# Patient Record
Sex: MALE | Race: WHITE | NOT HISPANIC OR LATINO | Employment: OTHER | URBAN - METROPOLITAN AREA
[De-identification: names, ages, dates, MRNs, and addresses within clinical notes are randomized per-mention and may not be internally consistent; named-entity substitution may affect disease eponyms.]

---

## 2023-04-19 ENCOUNTER — OFFICE VISIT (OUTPATIENT)
Dept: PULMONOLOGY | Facility: CLINIC | Age: 68
End: 2023-04-19
Payer: MEDICARE

## 2023-04-19 VITALS
HEART RATE: 65 BPM | BODY MASS INDEX: 22.38 KG/M2 | DIASTOLIC BLOOD PRESSURE: 74 MMHG | WEIGHT: 156.31 LBS | HEIGHT: 70 IN | SYSTOLIC BLOOD PRESSURE: 112 MMHG | OXYGEN SATURATION: 92 % | RESPIRATION RATE: 18 BRPM

## 2023-04-19 DIAGNOSIS — J96.11 CHRONIC RESPIRATORY FAILURE WITH HYPOXIA: Chronic | ICD-10-CM

## 2023-04-19 DIAGNOSIS — J43.2 CENTRILOBULAR EMPHYSEMA: Primary | ICD-10-CM

## 2023-04-19 DIAGNOSIS — Z87.891 HISTORY OF CIGARETTE SMOKING: Chronic | ICD-10-CM

## 2023-04-19 PROCEDURE — 1159F PR MEDICATION LIST DOCUMENTED IN MEDICAL RECORD: ICD-10-PCS | Mod: CPTII,S$GLB,, | Performed by: INTERNAL MEDICINE

## 2023-04-19 PROCEDURE — 3074F PR MOST RECENT SYSTOLIC BLOOD PRESSURE < 130 MM HG: ICD-10-PCS | Mod: CPTII,S$GLB,, | Performed by: INTERNAL MEDICINE

## 2023-04-19 PROCEDURE — 99999 PR PBB SHADOW E&M-NEW PATIENT-LVL IV: ICD-10-PCS | Mod: PBBFAC,,, | Performed by: INTERNAL MEDICINE

## 2023-04-19 PROCEDURE — 1101F PR PT FALLS ASSESS DOC 0-1 FALLS W/OUT INJ PAST YR: ICD-10-PCS | Mod: CPTII,S$GLB,, | Performed by: INTERNAL MEDICINE

## 2023-04-19 PROCEDURE — 99214 OFFICE O/P EST MOD 30 MIN: CPT | Mod: S$GLB,,, | Performed by: INTERNAL MEDICINE

## 2023-04-19 PROCEDURE — 3078F PR MOST RECENT DIASTOLIC BLOOD PRESSURE < 80 MM HG: ICD-10-PCS | Mod: CPTII,S$GLB,, | Performed by: INTERNAL MEDICINE

## 2023-04-19 PROCEDURE — 3008F BODY MASS INDEX DOCD: CPT | Mod: CPTII,S$GLB,, | Performed by: INTERNAL MEDICINE

## 2023-04-19 PROCEDURE — 3078F DIAST BP <80 MM HG: CPT | Mod: CPTII,S$GLB,, | Performed by: INTERNAL MEDICINE

## 2023-04-19 PROCEDURE — 3288F PR FALLS RISK ASSESSMENT DOCUMENTED: ICD-10-PCS | Mod: CPTII,S$GLB,, | Performed by: INTERNAL MEDICINE

## 2023-04-19 PROCEDURE — 99214 PR OFFICE/OUTPT VISIT, EST, LEVL IV, 30-39 MIN: ICD-10-PCS | Mod: S$GLB,,, | Performed by: INTERNAL MEDICINE

## 2023-04-19 PROCEDURE — 3074F SYST BP LT 130 MM HG: CPT | Mod: CPTII,S$GLB,, | Performed by: INTERNAL MEDICINE

## 2023-04-19 PROCEDURE — 3288F FALL RISK ASSESSMENT DOCD: CPT | Mod: CPTII,S$GLB,, | Performed by: INTERNAL MEDICINE

## 2023-04-19 PROCEDURE — 1126F AMNT PAIN NOTED NONE PRSNT: CPT | Mod: CPTII,S$GLB,, | Performed by: INTERNAL MEDICINE

## 2023-04-19 PROCEDURE — 99999 PR PBB SHADOW E&M-NEW PATIENT-LVL IV: CPT | Mod: PBBFAC,,, | Performed by: INTERNAL MEDICINE

## 2023-04-19 PROCEDURE — 1126F PR PAIN SEVERITY QUANTIFIED, NO PAIN PRESENT: ICD-10-PCS | Mod: CPTII,S$GLB,, | Performed by: INTERNAL MEDICINE

## 2023-04-19 PROCEDURE — 3008F PR BODY MASS INDEX (BMI) DOCUMENTED: ICD-10-PCS | Mod: CPTII,S$GLB,, | Performed by: INTERNAL MEDICINE

## 2023-04-19 PROCEDURE — 1159F MED LIST DOCD IN RCRD: CPT | Mod: CPTII,S$GLB,, | Performed by: INTERNAL MEDICINE

## 2023-04-19 PROCEDURE — 1101F PT FALLS ASSESS-DOCD LE1/YR: CPT | Mod: CPTII,S$GLB,, | Performed by: INTERNAL MEDICINE

## 2023-04-19 PROCEDURE — 1160F RVW MEDS BY RX/DR IN RCRD: CPT | Mod: CPTII,S$GLB,, | Performed by: INTERNAL MEDICINE

## 2023-04-19 PROCEDURE — 1160F PR REVIEW ALL MEDS BY PRESCRIBER/CLIN PHARMACIST DOCUMENTED: ICD-10-PCS | Mod: CPTII,S$GLB,, | Performed by: INTERNAL MEDICINE

## 2023-04-19 RX ORDER — ACETAMINOPHEN 500 MG
1 TABLET ORAL DAILY
COMMUNITY

## 2023-04-19 RX ORDER — ASPIRIN 81 MG/1
81 TABLET ORAL DAILY
COMMUNITY

## 2023-04-19 RX ORDER — TRAZODONE HYDROCHLORIDE 150 MG/1
TABLET ORAL
COMMUNITY
Start: 2023-04-15

## 2023-04-19 RX ORDER — SIMVASTATIN 40 MG/1
1 TABLET, FILM COATED ORAL DAILY
COMMUNITY
Start: 2023-03-30 | End: 2024-01-25 | Stop reason: SDUPTHER

## 2023-04-19 RX ORDER — FLUTICASONE FUROATE, UMECLIDINIUM BROMIDE AND VILANTEROL TRIFENATATE 200; 62.5; 25 UG/1; UG/1; UG/1
POWDER RESPIRATORY (INHALATION)
COMMUNITY
Start: 2022-08-29 | End: 2023-05-16

## 2023-04-19 NOTE — PROGRESS NOTES
Subjective:      Patient ID: Guanako Santiago is a 67 y.o. male.    Chief Complaint: F/U Emphysema    HPI    66 yo male with a history of severe emphysema and hx of lung CA. S/P lobectomy and chemoradiotherapy. BEHZAD thus far after 7 years. Also followed by Dr. Serna.     Had EB valve placement in [facility] on 12/13/21. Thus far has had a modest but favorable response  - CT showed less than complete atelectasis of the left lower lobe in March 2022  - Here to re-establish care at new [facility] location  - Stable dyspne aon exertion  - Desaturated previously on walk test at [facility], was not inclined to start oxygen at that time  - Thinks he probably needs it  - Otherwise no new complaints    Review of Systems as per HPI otherwise negative    Objective:     Physical Exam   Constitutional: He is oriented to person, place, and time. He appears well-developed. No distress.   Cardiovascular: Normal rate and regular rhythm.   Pulmonary/Chest: Hyperinflation and effort normal. He has decreased breath sounds. He has no wheezes.   Abdominal: Soft.   Musculoskeletal:      Cervical back: Neck supple.   Neurological: He is alert and oriented to person, place, and time.   Nursing note and vitals reviewed.       Assessment:     1. Centrilobular emphysema         Orders Placed This Encounter   Procedures    CT Chest Without Contrast     Standing Status:   Future     Standing Expiration Date:   4/19/2024     Order Specific Question:   May the Radiologist modify the order per protocol to meet the clinical needs of the patient?     Answer:   Yes    Ambulatory referral/consult to Transplant, Lung     Standing Status:   Future     Standing Expiration Date:   5/19/2024     Referral Priority:   Routine     Referral Type:   Transplants     Number of Visits Requested:   1    Stress test, pulmonary     Standing Status:   Future     Standing Expiration Date:   4/19/2024     Order Specific Question:   Reason for study     Answer:   Oxygen prescription      Order Specific Question:   Release to patient     Answer:   Immediate    Complete PFT w/ bronchodilator     Standing Status:   Future     Standing Expiration Date:   4/19/2024     Order Specific Question:   Release to patient     Answer:   Immediate         Plan:     We will get updated PFTs and 6 minute walk.  Likely will need oxygen with exertion and sleep and we can order once his need as established by the walk test.  We will also get PFTs done at that time.  He is due for yearly CT and we will order this for the next week or 2 as well.  Currently on Trelegy and as needed albuterol.  Encouraged him to continue exercising as tolerated.  He did inquire about the possibility of lung transplant and I placed a referral for evaluation for transplant.  He should be far enough out from his lung cancer that this should not be an issue.  I will notify him of test results as they return in place appropriate orders such as portable oxygen based on results.  Otherwise I will see him back in 3 months, sooner if needed

## 2023-04-20 ENCOUNTER — TELEPHONE (OUTPATIENT)
Dept: PULMONOLOGY | Facility: CLINIC | Age: 68
End: 2023-04-20
Payer: MEDICARE

## 2023-04-20 NOTE — TELEPHONE ENCOUNTER
----- Message from Sofia Alvarado sent at 4/20/2023  1:14 PM CDT -----  [employer] prior auth department is requesting a call back concerning a prior auth for the pt. Call back number is [phone]

## 2023-05-03 ENCOUNTER — CLINICAL SUPPORT (OUTPATIENT)
Dept: PULMONOLOGY | Facility: CLINIC | Age: 68
End: 2023-05-03
Payer: MEDICARE

## 2023-05-03 ENCOUNTER — HOSPITAL ENCOUNTER (OUTPATIENT)
Dept: RADIOLOGY | Facility: HOSPITAL | Age: 68
Discharge: HOME | End: 2023-05-03
Attending: INTERNAL MEDICINE
Payer: MEDICARE

## 2023-05-03 VITALS — BODY MASS INDEX: 22 KG/M2 | WEIGHT: 153.69 LBS | HEIGHT: 70 IN

## 2023-05-03 DIAGNOSIS — J96.11 CHRONIC RESPIRATORY FAILURE WITH HYPOXIA: Primary | ICD-10-CM

## 2023-05-03 DIAGNOSIS — J43.2 CENTRILOBULAR EMPHYSEMA: ICD-10-CM

## 2023-05-03 PROCEDURE — 71250 CT CHEST WITHOUT CONTRAST: ICD-10-PCS | Mod: 26,,, | Performed by: RADIOLOGY

## 2023-05-03 PROCEDURE — 99999 PR PBB SHADOW E&M-EST. PATIENT-LVL II: ICD-10-PCS | Mod: PBBFAC,,,

## 2023-05-03 PROCEDURE — 94729 PR C02/MEMBANE DIFFUSE CAPACITY: ICD-10-PCS | Mod: S$GLB,,, | Performed by: INTERNAL MEDICINE

## 2023-05-03 PROCEDURE — 94726 PULM FUNCT TST PLETHYSMOGRAP: ICD-10-PCS | Mod: S$GLB,,, | Performed by: INTERNAL MEDICINE

## 2023-05-03 PROCEDURE — 99999 PR PBB SHADOW E&M-EST. PATIENT-LVL II: CPT | Mod: PBBFAC,,,

## 2023-05-03 PROCEDURE — 94726 PLETHYSMOGRAPHY LUNG VOLUMES: CPT | Mod: S$GLB,,, | Performed by: INTERNAL MEDICINE

## 2023-05-03 PROCEDURE — 71250 CT THORAX DX C-: CPT | Mod: TC

## 2023-05-03 PROCEDURE — 94618 PULMONARY STRESS TESTING: CPT | Mod: S$GLB,,, | Performed by: INTERNAL MEDICINE

## 2023-05-03 PROCEDURE — 94060 PR EVAL OF BRONCHOSPASM: ICD-10-PCS | Mod: S$GLB,,, | Performed by: INTERNAL MEDICINE

## 2023-05-03 PROCEDURE — 94729 DIFFUSING CAPACITY: CPT | Mod: S$GLB,,, | Performed by: INTERNAL MEDICINE

## 2023-05-03 PROCEDURE — 71250 CT THORAX DX C-: CPT | Mod: 26,,, | Performed by: RADIOLOGY

## 2023-05-03 PROCEDURE — 94060 EVALUATION OF WHEEZING: CPT | Mod: S$GLB,,, | Performed by: INTERNAL MEDICINE

## 2023-05-03 PROCEDURE — 94618 PULMONARY STRESS TESTING: ICD-10-PCS | Mod: S$GLB,,, | Performed by: INTERNAL MEDICINE

## 2023-05-03 NOTE — PROGRESS NOTES
Six Minute Walk Test completed. Dr. Sánchez and E. Lejeune, NP made aware of pt qualifying for O2 on exertion.

## 2023-05-03 NOTE — PROCEDURES
"The [place]  Six Minute Walk   SUMMARY   Ordering Provider: Arturo Sánchez MD   Interpreting Provider: Arturo Sánchez MD  Performing nurse/tech/RT: VT, RT  Diagnosis:  (Centrilobular emphysema)  Height: 5' 10" (177.8 cm)  Weight: 69.7 kg (153 lb 10.6 oz)  BMI (Calculated): 22   Patient Race:    Phase Oxygen Assessment Supplemental O2 Heart   Rate Blood Pressure Deidre Dyspnea Scale Rating   Resting 92 % Room Air 59 bpm 135/73 0   Exercise        Minute        1 88 % Room Air 70 bpm     2 94 % 2 L/M 68 bpm     3 94 % 2 L/M 72 bpm     4 91 % 2 L/M 72 bpm     5 90 % 2 L/M 69 bpm     6  90 % 2 L/M 72 bpm 189/88 2   Recovery        Minute        1 96 % 2 L/M 66 bpm     2 97 % 2 L/M 63 bpm     3 97 % 2 L/M 59 bpm     4 98 % 2 L/M 59 bpm 153/75 0     Six Minute Walk Summary  6MWT Status: completed without stopping  Patient Reported: Other (Comment), Dyspnea (back pain)     Interpretation:  Did the patient stop or pause?: No         Total Time Walked (Calculated): 360 seconds  Final Partial Lap Distance (feet): 50 feet  Total Distance Meters (Calculated): 441.96 meters  Predicted Distance Meters (Calculated): 587.97 meters  Percentage of Predicted (Calculated): 75.17  Peak VO2 (Calculated): 17.24  Mets: 4.93  Has The Patient Had a Previous Six Minute Walk Test?: No       Previous 6MWT Results  Has The Patient Had a Previous Six Minute Walk Test?: No      "

## 2023-05-09 LAB
BRPFT: ABNORMAL
DLCO ADJ PRE: 14.05 ML/(MIN*MMHG) (ref 21.31–35.16)
DLCO SINGLE BREATH LLN: 21.31
DLCO SINGLE BREATH PRE REF: 49.8 %
DLCO SINGLE BREATH REF: 28.24
DLCOC SBVA LLN: 2.79
DLCOC SBVA PRE REF: 101.1 %
DLCOC SBVA REF: 3.95
DLCOC SINGLE BREATH LLN: 21.31
DLCOC SINGLE BREATH PRE REF: 49.8 %
DLCOC SINGLE BREATH REF: 28.24
DLCOVA LLN: 2.79
DLCOVA PRE REF: 101.1 %
DLCOVA PRE: 4 ML/(MIN*MMHG*L) (ref 2.79–5.12)
DLCOVA REF: 3.95
DLVAADJ PRE: 4 ML/(MIN*MMHG*L) (ref 2.79–5.12)
ERV LLN: -16448.87
ERV PRE REF: 85.1 %
ERV REF: 1.13
FEF 25 75 CHG: -3.5 %
FEF 25 75 LLN: 1.22
FEF 25 75 POST REF: 9.2 %
FEF 25 75 PRE REF: 9.6 %
FEF 25 75 REF: 2.66
FET100 CHG: 12.4 %
FEV1 CHG: 3.6 %
FEV1 FVC CHG: -8.6 %
FEV1 FVC LLN: 64
FEV1 FVC POST REF: 40.2 %
FEV1 FVC PRE REF: 43.9 %
FEV1 FVC REF: 77
FEV1 LLN: 2.49
FEV1 POST REF: 24 %
FEV1 PRE REF: 23.2 %
FEV1 REF: 3.38
FRCPLETH LLN: 2.67
FRCPLETH PREREF: 71.2 %
FRCPLETH REF: 3.66
FVC CHG: 13.3 %
FVC LLN: 3.32
FVC POST REF: 59.6 %
FVC PRE REF: 52.6 %
FVC REF: 4.42
IVC PRE: 2.48 L (ref 3.32–5.53)
IVC SINGLE BREATH LLN: 3.32
IVC SINGLE BREATH PRE REF: 56 %
IVC SINGLE BREATH REF: 4.42
PEF CHG: 3.3 %
PEF LLN: 6.52
PEF POST REF: 34.8 %
PEF PRE REF: 33.7 %
PEF REF: 8.84
POST FEF 25 75: 0.25 L/S (ref 1.22–4.1)
POST FET 100: 14.26 SEC
POST FEV1 FVC: 30.75 % (ref 63.78–89.38)
POST FEV1: 0.81 L (ref 2.49–4.27)
POST FVC: 2.64 L (ref 3.32–5.53)
POST PEF: 3.08 L/S (ref 6.52–11.16)
PRE DLCO: 14.05 ML/(MIN*MMHG) (ref 21.31–35.16)
PRE ERV: 0.96 L (ref -16448.87–16451.13)
PRE FEF 25 75: 0.26 L/S (ref 1.22–4.1)
PRE FET 100: 12.69 SEC
PRE FEV1 FVC: 33.65 % (ref 63.78–89.38)
PRE FEV1: 0.78 L (ref 2.49–4.27)
PRE FRC PL: 2.61 L
PRE FVC: 2.33 L (ref 3.32–5.53)
PRE PEF: 2.98 L/S (ref 6.52–11.16)
PRE RV: 1.63 L (ref 1.86–3.21)
PRE TLC: 3.98 L (ref 5.99–8.29)
RAW LLN: 3.06
RAW PRE REF: 946.1 %
RAW PRE: 28.94 CMH2O*S/L (ref 3.06–3.06)
RAW REF: 3.06
RV LLN: 1.86
RV PRE REF: 64.3 %
RV REF: 2.53
RVTLC LLN: 30
RVTLC PRE REF: 104.1 %
RVTLC PRE: 40.91 % (ref 30.33–48.29)
RVTLC REF: 39
TLC LLN: 5.99
TLC PRE REF: 55.7 %
TLC REF: 7.14
VA PRE: 3.52 L (ref 6.99–6.99)
VA SINGLE BREATH LLN: 6.99
VA SINGLE BREATH PRE REF: 50.4 %
VA SINGLE BREATH REF: 6.99
VC LLN: 3.32
VC PRE REF: 53.1 %
VC PRE: 2.35 L (ref 3.32–5.53)
VC REF: 4.42
VTGRAWPRE: 2.76 L

## 2023-05-16 RX ORDER — FLUTICASONE FUROATE, UMECLIDINIUM BROMIDE AND VILANTEROL TRIFENATATE 200; 62.5; 25 UG/1; UG/1; UG/1
1 POWDER RESPIRATORY (INHALATION) DAILY
Qty: 60 EACH | Refills: 6 | Status: SHIPPED | OUTPATIENT
Start: 2023-05-16 | End: 2023-10-09 | Stop reason: SDUPTHER

## 2023-07-24 ENCOUNTER — OFFICE VISIT (OUTPATIENT)
Dept: PULMONOLOGY | Facility: CLINIC | Age: 68
End: 2023-07-24
Payer: MEDICARE

## 2023-07-24 VITALS
OXYGEN SATURATION: 94 % | SYSTOLIC BLOOD PRESSURE: 130 MMHG | WEIGHT: 160.94 LBS | BODY MASS INDEX: 23.04 KG/M2 | RESPIRATION RATE: 20 BRPM | DIASTOLIC BLOOD PRESSURE: 74 MMHG | HEART RATE: 58 BPM | HEIGHT: 70 IN

## 2023-07-24 DIAGNOSIS — Z85.118 HISTORY OF LUNG CANCER: Chronic | ICD-10-CM

## 2023-07-24 DIAGNOSIS — J43.2 CENTRILOBULAR EMPHYSEMA: Primary | ICD-10-CM

## 2023-07-24 PROBLEM — J96.11 CHRONIC RESPIRATORY FAILURE WITH HYPOXIA: Chronic | Status: RESOLVED | Noted: 2023-04-19 | Resolved: 2023-07-24

## 2023-07-24 PROCEDURE — 99999 PR PBB SHADOW E&M-EST. PATIENT-LVL IV: ICD-10-PCS | Mod: PBBFAC,,, | Performed by: INTERNAL MEDICINE

## 2023-07-24 PROCEDURE — 1101F PR PT FALLS ASSESS DOC 0-1 FALLS W/OUT INJ PAST YR: ICD-10-PCS | Mod: CPTII,S$GLB,, | Performed by: INTERNAL MEDICINE

## 2023-07-24 PROCEDURE — 3078F PR MOST RECENT DIASTOLIC BLOOD PRESSURE < 80 MM HG: ICD-10-PCS | Mod: CPTII,S$GLB,, | Performed by: INTERNAL MEDICINE

## 2023-07-24 PROCEDURE — 3078F DIAST BP <80 MM HG: CPT | Mod: CPTII,S$GLB,, | Performed by: INTERNAL MEDICINE

## 2023-07-24 PROCEDURE — 1160F RVW MEDS BY RX/DR IN RCRD: CPT | Mod: CPTII,S$GLB,, | Performed by: INTERNAL MEDICINE

## 2023-07-24 PROCEDURE — 3075F PR MOST RECENT SYSTOLIC BLOOD PRESS GE 130-139MM HG: ICD-10-PCS | Mod: CPTII,S$GLB,, | Performed by: INTERNAL MEDICINE

## 2023-07-24 PROCEDURE — 99999 PR PBB SHADOW E&M-EST. PATIENT-LVL IV: CPT | Mod: PBBFAC,,, | Performed by: INTERNAL MEDICINE

## 2023-07-24 PROCEDURE — 99213 PR OFFICE/OUTPT VISIT, EST, LEVL III, 20-29 MIN: ICD-10-PCS | Mod: S$GLB,,, | Performed by: INTERNAL MEDICINE

## 2023-07-24 PROCEDURE — 3288F PR FALLS RISK ASSESSMENT DOCUMENTED: ICD-10-PCS | Mod: CPTII,S$GLB,, | Performed by: INTERNAL MEDICINE

## 2023-07-24 PROCEDURE — 3008F BODY MASS INDEX DOCD: CPT | Mod: CPTII,S$GLB,, | Performed by: INTERNAL MEDICINE

## 2023-07-24 PROCEDURE — 1159F MED LIST DOCD IN RCRD: CPT | Mod: CPTII,S$GLB,, | Performed by: INTERNAL MEDICINE

## 2023-07-24 PROCEDURE — 3075F SYST BP GE 130 - 139MM HG: CPT | Mod: CPTII,S$GLB,, | Performed by: INTERNAL MEDICINE

## 2023-07-24 PROCEDURE — 3008F PR BODY MASS INDEX (BMI) DOCUMENTED: ICD-10-PCS | Mod: CPTII,S$GLB,, | Performed by: INTERNAL MEDICINE

## 2023-07-24 PROCEDURE — 1159F PR MEDICATION LIST DOCUMENTED IN MEDICAL RECORD: ICD-10-PCS | Mod: CPTII,S$GLB,, | Performed by: INTERNAL MEDICINE

## 2023-07-24 PROCEDURE — 1126F AMNT PAIN NOTED NONE PRSNT: CPT | Mod: CPTII,S$GLB,, | Performed by: INTERNAL MEDICINE

## 2023-07-24 PROCEDURE — 3288F FALL RISK ASSESSMENT DOCD: CPT | Mod: CPTII,S$GLB,, | Performed by: INTERNAL MEDICINE

## 2023-07-24 PROCEDURE — 1160F PR REVIEW ALL MEDS BY PRESCRIBER/CLIN PHARMACIST DOCUMENTED: ICD-10-PCS | Mod: CPTII,S$GLB,, | Performed by: INTERNAL MEDICINE

## 2023-07-24 PROCEDURE — 99213 OFFICE O/P EST LOW 20 MIN: CPT | Mod: S$GLB,,, | Performed by: INTERNAL MEDICINE

## 2023-07-24 PROCEDURE — 1126F PR PAIN SEVERITY QUANTIFIED, NO PAIN PRESENT: ICD-10-PCS | Mod: CPTII,S$GLB,, | Performed by: INTERNAL MEDICINE

## 2023-07-24 PROCEDURE — 1101F PT FALLS ASSESS-DOCD LE1/YR: CPT | Mod: CPTII,S$GLB,, | Performed by: INTERNAL MEDICINE

## 2023-07-24 NOTE — PROGRESS NOTES
Subjective:      Patient ID: Guanako Santiago is a 68 y.o. male.    Chief Complaint: COPD    COPD      67 yo male with a history of severe emphysema and hx of lung CA. S/P lobectomy and chemoradiotherapy. BEHZAD thus far after 7 years. Also followed by Dr. Serna.     Had EB valve placement in [facility] on 12/13/21. Thus far has had a modest but favorable response  - CT showed less than complete atelectasis of the left lower lobe in March 2022  - Here to re-establish care at new [facility] location  - Stable dyspne aon exertion  - Desaturated previously on walk test at [facility], was not inclined to start oxygen at that time  - Thinks he probably needs it  - Otherwise no new complaints    July 2023  - overall stable  - had PFTs and 6 minute walk as well as a CT of the chest in May  - desaturation with walk test and was prescribed portable oxygen  - currently has large portable oxygen concentrator that is a poor behind but would rather have small portable oxygen concentrator for increased mobility  - states that he intermittently has hemoptysis since valve placement this has been ongoing and fairly stable vital appear for few days of being gone for a few weeks  - CT showed some scattered lung nodules which are stable but no new or detrimental findings    Review of Systems as per HPI otherwise negative    Objective:     Physical Exam   Constitutional: He is oriented to person, place, and time. He appears well-developed. No distress.   HENT:   Head: Normocephalic.   Cardiovascular: Normal rate and regular rhythm.   Pulmonary/Chest: Hyperinflation and effort normal. He has decreased breath sounds.   Abdominal: Soft.   Musculoskeletal:      Cervical back: Neck supple.   Neurological: He is alert and oriented to person, place, and time.   Psychiatric: He has a normal mood and affect.   Nursing note and vitals reviewed.       Assessment:     1. Centrilobular emphysema    2. History of lung cancer         Orders Placed This Encounter  "  Procedures    OXYGEN FOR HOME USE     Order Specific Question:   Liter Flow     Answer:   2     Order Specific Question:   Duration     Answer:   With sleep     Comments:   and activity     Order Specific Question:   Qualifying Test Performed at:     Answer:   Activity     Order Specific Question:   Oxygen saturation at rest     Answer:   92     Order Specific Question:   Oxygen saturation with activity     Answer:   86     Order Specific Question:   Oxygen saturation with activity on oxygen     Answer:   92     Order Specific Question:   Portable mode:     Answer:   pulse dose acceptable     Order Specific Question:   Mode:     Answer:   Portable concentrator     Comments:   small portable concentrator preferred     Order Specific Question:   Route     Answer:   nasal cannula     Order Specific Question:   Device:     Answer:   home concentrator with portable concentrator     Order Specific Question:   Length of need (in months):     Answer:   99 mos     Order Specific Question:   Patient condition with qualifying saturation     Answer:   COPD     Order Specific Question:   Height:     Answer:   5' 10" (1.778 m)     Order Specific Question:   Weight:     Answer:   73 kg (160 lb 15 oz)     Order Specific Question:   Alternative treatment measures have been tried or considered and deemed clinically ineffective.     Answer:   Yes         Plan:     Overall stable from pulmonary standpoint  Hemoptysis is likely due to granulation tissue in the region of the valve insertion sites it is not unexpected  No detrimental findings on recent CT  Continue inhaled Trelegy once daily  Sent request for new small portable oxygen concentrator   Are return to clinic in 6 months, sooner  "

## 2023-10-09 RX ORDER — FLUTICASONE FUROATE, UMECLIDINIUM BROMIDE AND VILANTEROL TRIFENATATE 200; 62.5; 25 UG/1; UG/1; UG/1
1 POWDER RESPIRATORY (INHALATION) DAILY
Qty: 90 EACH | Refills: 3 | Status: SHIPPED | OUTPATIENT
Start: 2023-10-09 | End: 2024-01-25 | Stop reason: SDUPTHER

## 2024-01-25 ENCOUNTER — OFFICE VISIT (OUTPATIENT)
Dept: PULMONOLOGY | Facility: CLINIC | Age: 69
End: 2024-01-25
Payer: MEDICARE

## 2024-01-25 ENCOUNTER — TELEPHONE (OUTPATIENT)
Dept: PULMONOLOGY | Facility: CLINIC | Age: 69
End: 2024-01-25

## 2024-01-25 VITALS
HEIGHT: 70 IN | RESPIRATION RATE: 19 BRPM | BODY MASS INDEX: 22.41 KG/M2 | HEART RATE: 78 BPM | DIASTOLIC BLOOD PRESSURE: 62 MMHG | SYSTOLIC BLOOD PRESSURE: 116 MMHG | WEIGHT: 156.5 LBS | OXYGEN SATURATION: 94 %

## 2024-01-25 DIAGNOSIS — E78.5 HYPERLIPIDEMIA, UNSPECIFIED HYPERLIPIDEMIA TYPE: ICD-10-CM

## 2024-01-25 DIAGNOSIS — J43.2 CENTRILOBULAR EMPHYSEMA: ICD-10-CM

## 2024-01-25 DIAGNOSIS — C34.31 MALIGNANT NEOPLASM OF LOWER LOBE OF RIGHT LUNG: Primary | ICD-10-CM

## 2024-01-25 DIAGNOSIS — I10 ESSENTIAL HYPERTENSION: ICD-10-CM

## 2024-01-25 DIAGNOSIS — J96.11 CHRONIC RESPIRATORY FAILURE WITH HYPOXIA: Chronic | ICD-10-CM

## 2024-01-25 PROCEDURE — 1159F MED LIST DOCD IN RCRD: CPT | Mod: CPTII,S$GLB,, | Performed by: INTERNAL MEDICINE

## 2024-01-25 PROCEDURE — 3074F SYST BP LT 130 MM HG: CPT | Mod: CPTII,S$GLB,, | Performed by: INTERNAL MEDICINE

## 2024-01-25 PROCEDURE — 3288F FALL RISK ASSESSMENT DOCD: CPT | Mod: CPTII,S$GLB,, | Performed by: INTERNAL MEDICINE

## 2024-01-25 PROCEDURE — 99214 OFFICE O/P EST MOD 30 MIN: CPT | Mod: S$GLB,,, | Performed by: INTERNAL MEDICINE

## 2024-01-25 PROCEDURE — 3008F BODY MASS INDEX DOCD: CPT | Mod: CPTII,S$GLB,, | Performed by: INTERNAL MEDICINE

## 2024-01-25 PROCEDURE — 1101F PT FALLS ASSESS-DOCD LE1/YR: CPT | Mod: CPTII,S$GLB,, | Performed by: INTERNAL MEDICINE

## 2024-01-25 PROCEDURE — 99999 PR PBB SHADOW E&M-EST. PATIENT-LVL III: CPT | Mod: PBBFAC,,, | Performed by: INTERNAL MEDICINE

## 2024-01-25 PROCEDURE — 1160F RVW MEDS BY RX/DR IN RCRD: CPT | Mod: CPTII,S$GLB,, | Performed by: INTERNAL MEDICINE

## 2024-01-25 PROCEDURE — 3078F DIAST BP <80 MM HG: CPT | Mod: CPTII,S$GLB,, | Performed by: INTERNAL MEDICINE

## 2024-01-25 RX ORDER — FLUTICASONE FUROATE, UMECLIDINIUM BROMIDE AND VILANTEROL TRIFENATATE 200; 62.5; 25 UG/1; UG/1; UG/1
1 POWDER RESPIRATORY (INHALATION) DAILY
Qty: 90 EACH | Refills: 3 | Status: SHIPPED | OUTPATIENT
Start: 2024-01-25

## 2024-01-25 RX ORDER — SIMVASTATIN 40 MG/1
40 TABLET, FILM COATED ORAL DAILY
Qty: 90 TABLET | Refills: 3 | Status: SHIPPED | OUTPATIENT
Start: 2024-01-25

## 2024-01-25 NOTE — PROGRESS NOTES
Subjective:      Patient ID: Guanako Santiago is a 68 y.o. male.    Chief Complaint: Certrilobular emphysema    HPI    69 yo male with a history of severe emphysema and hx of lung CA. S/P lobectomy and chemoradiotherapy. BEHZAD thus far after 7 years. Also followed by Dr. Serna.     Had EB valve placement in [facility] on 12/13/21. Thus far has had a modest but favorable response  - CT showed less than complete atelectasis of the left lower lobe in March 2022  - Here to re-establish care at new [facility] location  - Stable dyspne aon exertion  - Desaturated previously on walk test at [facility], was not inclined to start oxygen at that time  - Thinks he probably needs it  - Otherwise no new complaints     July 2023  - overall stable  - had PFTs and 6 minute walk as well as a CT of the chest in May  - desaturation with walk test and was prescribed portable oxygen  - currently has large portable oxygen concentrator that is a poor behind but would rather have small portable oxygen concentrator for increased mobility  - states that he intermittently has hemoptysis since valve placement this has been ongoing and fairly stable vital appear for few days of being gone for a few weeks  - CT showed some scattered lung nodules which are stable but no new or detrimental findings    Jan 2024:  - Mostly stable, gets breathless very easily  - Using portable oxygen  - On [place] medical tx  - No further hemoptysis  - Asking about any other treatment modalities. Did have NIPPV after lung cancer surgery and did very well with it, asking if this is an option again    Review of Systems as per hPI otherwise negative    Objective:     Physical Exam   Constitutional: He is oriented to person, place, and time. No distress.   HENT:   Head: Normocephalic.   Cardiovascular: Normal rate and regular rhythm.   Pulmonary/Chest:   Globally decreased, no wheezing   Musculoskeletal:         General: No edema.      Cervical back: Neck supple.   Neurological: He is  alert and oriented to person, place, and time.   Psychiatric: He has a normal mood and affect.   Nursing note and vitals reviewed.        Assessment:     1. Malignant neoplasm of lower lobe of right lung    2. Centrilobular emphysema    3. Essential hypertension    4. Hyperlipidemia, unspecified hyperlipidemia type    5. Chronic respiratory failure with hypoxia          Plan:     -  maximal medical therapy for his COPD/emphysema  Status post endobronchial valve placement with spirometric daily confirmed reduction in his hyperinflation  Stable CT findings at last visit  Previously responded well to noninvasive positive pressure ventilation  We will reinstitute treatment with AVAPS capable noninvasive positive pressure ventilation machine such as trilogy.  I have considered BiPAP but this is insufficient to meet the patient's needs given the severity of his disease and variable lung compliance as well as the need for guarantee tidal volume delivery to ensure adequate ventilation  It is to be used nightly with sleep and p.r.n. during the daytime with naps  Oxygen will be bled in at 2 liters/minute  We will have him back in 3 months to ensure compliance and to ensure settings are appropriate  I discussed the above in detail with the patient who voiced understanding and agreement with this plan     regular rate and rhythm/no rub

## 2024-01-25 NOTE — TELEPHONE ENCOUNTER
[facility] [phone] with face sheet, lov, pft attached.  Scanned to chart. Fax confirmation received.

## 2024-02-29 DIAGNOSIS — J98.4 RESTRICTIVE LUNG DISEASE: Primary | ICD-10-CM

## 2024-04-29 PROBLEM — J96.11 CHRONIC RESPIRATORY FAILURE WITH HYPOXIA: Chronic | Status: RESOLVED | Noted: 2023-04-19 | Resolved: 2024-04-29

## 2024-05-07 ENCOUNTER — OFFICE VISIT (OUTPATIENT)
Dept: PULMONOLOGY | Facility: CLINIC | Age: 69
End: 2024-05-07
Payer: MEDICARE

## 2024-05-07 ENCOUNTER — HOSPITAL ENCOUNTER (OUTPATIENT)
Dept: RADIOLOGY | Facility: HOSPITAL | Age: 69
Discharge: HOME | End: 2024-05-07
Attending: INTERNAL MEDICINE
Payer: MEDICARE

## 2024-05-07 VITALS
OXYGEN SATURATION: 93 % | DIASTOLIC BLOOD PRESSURE: 82 MMHG | SYSTOLIC BLOOD PRESSURE: 120 MMHG | RESPIRATION RATE: 20 BRPM | HEIGHT: 70 IN | HEART RATE: 72 BPM | BODY MASS INDEX: 21.94 KG/M2 | WEIGHT: 153.25 LBS

## 2024-05-07 DIAGNOSIS — J20.9 ACUTE BRONCHITIS, UNSPECIFIED ORGANISM: Primary | ICD-10-CM

## 2024-05-07 DIAGNOSIS — J44.9 STAGE 4 VERY SEVERE COPD BY GOLD CLASSIFICATION: ICD-10-CM

## 2024-05-07 DIAGNOSIS — R04.2 HEMOPTYSIS: ICD-10-CM

## 2024-05-07 PROCEDURE — 3074F SYST BP LT 130 MM HG: CPT | Mod: CPTII,S$GLB,, | Performed by: INTERNAL MEDICINE

## 2024-05-07 PROCEDURE — 3288F FALL RISK ASSESSMENT DOCD: CPT | Mod: CPTII,S$GLB,, | Performed by: INTERNAL MEDICINE

## 2024-05-07 PROCEDURE — 99214 OFFICE O/P EST MOD 30 MIN: CPT | Mod: S$GLB,,, | Performed by: INTERNAL MEDICINE

## 2024-05-07 PROCEDURE — 1159F MED LIST DOCD IN RCRD: CPT | Mod: CPTII,S$GLB,, | Performed by: INTERNAL MEDICINE

## 2024-05-07 PROCEDURE — 1160F RVW MEDS BY RX/DR IN RCRD: CPT | Mod: CPTII,S$GLB,, | Performed by: INTERNAL MEDICINE

## 2024-05-07 PROCEDURE — 3008F BODY MASS INDEX DOCD: CPT | Mod: CPTII,S$GLB,, | Performed by: INTERNAL MEDICINE

## 2024-05-07 PROCEDURE — 71250 CT THORAX DX C-: CPT | Mod: TC

## 2024-05-07 PROCEDURE — 3079F DIAST BP 80-89 MM HG: CPT | Mod: CPTII,S$GLB,, | Performed by: INTERNAL MEDICINE

## 2024-05-07 PROCEDURE — 1101F PT FALLS ASSESS-DOCD LE1/YR: CPT | Mod: CPTII,S$GLB,, | Performed by: INTERNAL MEDICINE

## 2024-05-07 PROCEDURE — 71250 CT THORAX DX C-: CPT | Mod: 26,,, | Performed by: RADIOLOGY

## 2024-05-07 PROCEDURE — 99999 PR PBB SHADOW E&M-EST. PATIENT-LVL IV: CPT | Mod: PBBFAC,,, | Performed by: INTERNAL MEDICINE

## 2024-05-07 RX ORDER — LEVOFLOXACIN 750 MG/1
750 TABLET ORAL DAILY
Qty: 10 TABLET | Refills: 0 | Status: SHIPPED | OUTPATIENT
Start: 2024-05-07

## 2024-05-07 RX ORDER — TAMSULOSIN HYDROCHLORIDE 0.4 MG/1
1 CAPSULE ORAL EVERY MORNING
COMMUNITY
Start: 2024-04-10

## 2024-05-07 RX ORDER — PREDNISONE 20 MG/1
TABLET ORAL
Qty: 20 TABLET | Refills: 0 | Status: SHIPPED | OUTPATIENT
Start: 2024-05-07

## 2024-05-07 NOTE — PROGRESS NOTES
Subjective:      Patient ID: Guanako Santiago is a 68 y.o. male.    Chief Complaint: Emphysema    HPI    69 yo male with a history of severe emphysema and hx of lung CA. S/P lobectomy and chemoradiotherapy. BEHZAD thus far after 7 years. Also followed by Dr. Serna.     Had EB valve placement in [facility] on 12/13/21. Thus far has had a modest but favorable response  - CT showed less than complete atelectasis of the left lower lobe in March 2022  - Here to re-establish care at new [facility] location  - Stable dyspne aon exertion  - Desaturated previously on walk test at [facility], was not inclined to start oxygen at that time  - Thinks he probably needs it  - Otherwise no new complaints     July 2023  - overall stable  - had PFTs and 6 minute walk as well as a CT of the chest in May  - desaturation with walk test and was prescribed portable oxygen  - currently has large portable oxygen concentrator that is a poor behind but would rather have small portable oxygen concentrator for increased mobility  - states that he intermittently has hemoptysis since valve placement this has been ongoing and fairly stable vital appear for few days of being gone for a few weeks  - CT showed some scattered lung nodules which are stable but no new or detrimental findings     Jan 2024:  - Mostly stable, gets breathless very easily  - Using portable oxygen  - On [facility]  - No further hemoptysis  - Asking about any other treatment modalities. Did have NIPPV after lung cancer surgery and did very well with it, asking if this is an option again    May 2024  - was doing fairly well up until about 4 or 5 days ago  -since that time has had some left-sided pleuritic chest pains well as increasing hemoptysis  -was not able to get nocturnal noninvasive positive pressure device was denied by insurance  -on [facility] therapy  -using oxygen as prescribed  -denies fevers and chills    Review of Systems as per history of present illness otherwise  negative  Objective:     Physical Exam   Constitutional: He is oriented to person, place, and time. He appears well-developed. No distress.   HENT:   Head: Normocephalic.   Cardiovascular: Normal rate and regular rhythm.   Pulmonary/Chest: Normal expansion and symmetric chest wall expansion. He has no wheezes. He has rales.   Musculoskeletal:      Cervical back: Neck supple.   Neurological: He is alert and oriented to person, place, and time.   Psychiatric: He has a normal mood and affect.   Nursing note and vitals reviewed.         Assessment:     1. Acute bronchitis, unspecified organism    2. Hemoptysis         Orders Placed This Encounter   Procedures    CT Chest Without Contrast     Order Specific Question:   May the Radiologist modify the order per protocol to meet the clinical needs of the patient?     Answer:   Yes       Plan:     We will treat acute bronchitis for 10 days with Levaquin and prednisone taper  We will recent request for NIPPV device.  He should qualify based on pulmonary function test data alone  Continue oxygen as prescribed  Continue inhaled medications as prescribed  Due for his yearly CT of the chest today we will get this today in clinic  Return in 3 months, sooner if needed

## 2024-05-16 DIAGNOSIS — J44.9 CHRONIC OBSTRUCTIVE PULMONARY DISEASE, UNSPECIFIED COPD TYPE: Primary | ICD-10-CM

## 2024-05-20 ENCOUNTER — CLINICAL SUPPORT (OUTPATIENT)
Dept: PULMONOLOGY | Facility: CLINIC | Age: 69
End: 2024-05-20
Payer: MEDICARE

## 2024-05-20 DIAGNOSIS — J44.9 CHRONIC OBSTRUCTIVE PULMONARY DISEASE, UNSPECIFIED COPD TYPE: ICD-10-CM

## 2024-05-20 PROCEDURE — 94010 BREATHING CAPACITY TEST: CPT | Mod: S$GLB,,, | Performed by: INTERNAL MEDICINE

## 2024-05-21 LAB
BRPFT: NORMAL
FEF 25 75 LLN: 1.18
FEF 25 75 PRE REF: 16.6 %
FEF 25 75 REF: 2.61
FEV1 FVC LLN: 63
FEV1 FVC PRE REF: 69.7 %
FEV1 FVC REF: 76
FEV1 LLN: 2.45
FEV1 PRE REF: 24.7 %
FEV1 REF: 3.34
FVC LLN: 3.28
FVC PRE REF: 35.3 %
FVC REF: 4.38
PEF LLN: 6.43
PEF PRE REF: 31.9 %
PEF REF: 8.75
PRE FEF 25 75: 0.43 L/S
PRE FET 100: 4.07 SEC
PRE FEV1 FVC: 53.26 %
PRE FEV1: 0.82 L
PRE FVC: 1.55 L
PRE PEF: 2.79 L/S

## 2024-08-07 ENCOUNTER — OFFICE VISIT (OUTPATIENT)
Dept: PULMONOLOGY | Facility: CLINIC | Age: 69
End: 2024-08-07
Payer: MEDICARE

## 2024-08-07 VITALS
WEIGHT: 147.06 LBS | RESPIRATION RATE: 16 BRPM | DIASTOLIC BLOOD PRESSURE: 70 MMHG | HEART RATE: 75 BPM | SYSTOLIC BLOOD PRESSURE: 110 MMHG | BODY MASS INDEX: 21.05 KG/M2 | OXYGEN SATURATION: 93 % | HEIGHT: 70 IN

## 2024-08-07 DIAGNOSIS — Z85.118 HISTORY OF LUNG CANCER: Chronic | ICD-10-CM

## 2024-08-07 DIAGNOSIS — Z87.891 HISTORY OF CIGARETTE SMOKING: Chronic | ICD-10-CM

## 2024-08-07 DIAGNOSIS — J43.2 CENTRILOBULAR EMPHYSEMA: Chronic | ICD-10-CM

## 2024-08-07 DIAGNOSIS — C34.31 MALIGNANT NEOPLASM OF LOWER LOBE OF RIGHT LUNG: ICD-10-CM

## 2024-08-07 DIAGNOSIS — J41.8 MIXED SIMPLE AND MUCOPURULENT CHRONIC BRONCHITIS: Primary | ICD-10-CM

## 2024-08-07 PROCEDURE — 99999 PR PBB SHADOW E&M-EST. PATIENT-LVL IV: CPT | Mod: PBBFAC,,, | Performed by: INTERNAL MEDICINE

## 2024-08-07 RX ORDER — PREDNISONE 10 MG/1
10 TABLET ORAL DAILY
Qty: 90 TABLET | Refills: 3 | Status: SHIPPED | OUTPATIENT
Start: 2024-08-07

## 2024-08-20 DIAGNOSIS — J41.8 MIXED SIMPLE AND MUCOPURULENT CHRONIC BRONCHITIS: ICD-10-CM

## 2024-08-20 RX ORDER — PREDNISONE 10 MG/1
10 TABLET ORAL DAILY
Qty: 90 TABLET | Refills: 3 | Status: SHIPPED | OUTPATIENT
Start: 2024-08-20

## 2024-11-15 DIAGNOSIS — J43.2 CENTRILOBULAR EMPHYSEMA: ICD-10-CM

## 2024-11-15 RX ORDER — FLUTICASONE FUROATE, UMECLIDINIUM BROMIDE AND VILANTEROL TRIFENATATE 200; 62.5; 25 UG/1; UG/1; UG/1
1 POWDER RESPIRATORY (INHALATION) DAILY
Qty: 60 EACH | Refills: 3 | Status: SHIPPED | OUTPATIENT
Start: 2024-11-15

## 2024-11-22 RX ORDER — PROPRANOLOL HYDROCHLORIDE 120 MG/1
120 CAPSULE, EXTENDED RELEASE ORAL
Qty: 90 CAPSULE | Refills: 3 | Status: SHIPPED | OUTPATIENT
Start: 2024-11-22

## 2024-12-30 DIAGNOSIS — J20.9 ACUTE BRONCHITIS, UNSPECIFIED ORGANISM: ICD-10-CM

## 2024-12-31 RX ORDER — LEVOFLOXACIN 750 MG/1
750 TABLET ORAL DAILY
Qty: 10 TABLET | Refills: 0 | Status: SHIPPED | OUTPATIENT
Start: 2024-12-31

## 2025-01-29 ENCOUNTER — TELEPHONE (OUTPATIENT)
Dept: PULMONOLOGY | Facility: CLINIC | Age: 70
End: 2025-01-29
Payer: MEDICARE

## 2025-01-29 NOTE — TELEPHONE ENCOUNTER
----- Message from [employer] sent at 1/29/2025  2:52 PM CST -----  Contact: Guanako  .Type:  Needs Medical Advice    Who Called:  Guanako   Symptoms (please be specific):  Trouble breathing    How long has patient had these symptoms:   01/27    Would the patient rather a call back or a response via [facility]?  Call back   Best Call Back Number:  [phone] (home)    Additional Information:  Pt is calling in regard to getting a call back to discuss concerns and states he is having trouble breath whenever he gets up an moves around and he is wear the oxygen.  Pt states the oxygen is on 4L and as long as he is sitting he is fine.  Pt states he is considering going to the ER     Symptom: Breathing Trouble  Outcome: Instruct patient to Call 911 NOW!  Reason: Uses oxygen    Thanks

## 2025-03-12 ENCOUNTER — TELEPHONE (OUTPATIENT)
Dept: PULMONOLOGY | Facility: CLINIC | Age: 70
End: 2025-03-12
Payer: MEDICARE
